# Patient Record
Sex: FEMALE | Race: WHITE | NOT HISPANIC OR LATINO | URBAN - METROPOLITAN AREA
[De-identification: names, ages, dates, MRNs, and addresses within clinical notes are randomized per-mention and may not be internally consistent; named-entity substitution may affect disease eponyms.]

---

## 2023-02-06 ENCOUNTER — APPOINTMENT (RX ONLY)
Dept: URBAN - METROPOLITAN AREA CLINIC 12 | Facility: CLINIC | Age: 28
Setting detail: DERMATOLOGY
End: 2023-02-06

## 2023-02-06 DIAGNOSIS — L90.5 SCAR CONDITIONS AND FIBROSIS OF SKIN: ICD-10-CM

## 2023-02-06 PROCEDURE — ? PATIENT SPECIFIC COUNSELING

## 2023-02-06 PROCEDURE — ? INTRALESIONAL KENALOG (COSMETIC)

## 2023-02-06 PROCEDURE — ? COUNSELING - SCAR (COSMETIC)

## 2023-02-06 ASSESSMENT — LOCATION SIMPLE DESCRIPTION DERM
LOCATION SIMPLE: RIGHT LIP
LOCATION SIMPLE: RIGHT LIP

## 2023-02-06 ASSESSMENT — LOCATION DETAILED DESCRIPTION DERM
LOCATION DETAILED: RIGHT INFERIOR VERMILION LIP
LOCATION DETAILED: RIGHT INFERIOR VERMILION LIP

## 2023-02-06 ASSESSMENT — LOCATION ZONE DERM
LOCATION ZONE: LIP
LOCATION ZONE: LIP

## 2023-02-06 NOTE — PROCEDURE: COUNSELING - SCAR (COSMETIC)
Detail Level: Detailed
Procedure Quote Preamble: The recommended treatment regimen includes all modalities selected and is global for the treatments during a 6 month period. Treatment is not billable to insurance. Treatment cost: $
Count Minor/Major Decisions Toward Mdm (Not Cosmetic)?: No

## 2023-02-06 NOTE — PROCEDURE: INTRALESIONAL KENALOG (COSMETIC)
Kenalog Preparation: kenalog
Detail Level: Detailed
Concentration (Mg/Ml): 1
Administered By (Optional): Dr. De Leon
Consent: The risks of atrophy were reviewed with the patient.

## 2023-02-06 NOTE — PROCEDURE: PATIENT SPECIFIC COUNSELING
Other (Free Text): Patient voiced she has some concerns regarding her lip feeling like there’s a bump where the scar is at. Would like to see what options can help reduce the bumpy feeling. She states she has no pain but more of discomfort. The patient has no other concerns or complaints. \\n\\Dany De Leon examine the patient and voiced surgery was not needed. He did recommend Kenalog 10 injection and patient voiced she would like to try that. Dr. De Leon also voiced patient can massage the area with an electric toothbrush. Kenalog 10 injection was given to the patient and she tolerated very well. Return back 4 weeks.
Detail Level: Zone

## 2023-03-06 ENCOUNTER — APPOINTMENT (RX ONLY)
Dept: URBAN - METROPOLITAN AREA CLINIC 12 | Facility: CLINIC | Age: 28
Setting detail: DERMATOLOGY
End: 2023-03-06

## 2023-03-06 DIAGNOSIS — L90.5 SCAR CONDITIONS AND FIBROSIS OF SKIN: ICD-10-CM

## 2023-03-06 PROCEDURE — ? INTRALESIONAL KENALOG

## 2023-03-06 PROCEDURE — ? PATIENT SPECIFIC COUNSELING

## 2023-03-06 PROCEDURE — 11900 INJECT SKIN LESIONS </W 7: CPT

## 2023-03-06 PROCEDURE — ? COUNSELING - SCAR (COSMETIC)

## 2023-03-06 ASSESSMENT — LOCATION ZONE DERM
LOCATION ZONE: LIP
LOCATION ZONE: LIP

## 2023-03-06 ASSESSMENT — LOCATION SIMPLE DESCRIPTION DERM
LOCATION SIMPLE: LEFT LIP
LOCATION SIMPLE: RIGHT LIP

## 2023-03-06 ASSESSMENT — LOCATION DETAILED DESCRIPTION DERM
LOCATION DETAILED: LEFT INFERIOR VERMILION LIP
LOCATION DETAILED: RIGHT INFERIOR VERMILION LIP

## 2023-03-06 NOTE — PROCEDURE: PATIENT SPECIFIC COUNSELING
Other (Free Text): Patient voiced she has some concerns regarding her lip feeling like there?s a bump where the scar is at. Would like to see what options can help reduce the bumpy feeling. She states she has no pain but more of discomfort. The patient has no other concerns or complaints. \\n\\Dany De Leon examine the patient and voiced surgery was not needed. He did recommend Kenalog 10 injection and patient voiced she would like to try that. Dr. De Leon also voiced patient can massage the area with an electric toothbrush. Kenalog 10 injection was given to the patient and she tolerated very well. Return back 4 weeks.\\n\\n\\nPatient RTC from previous visit from her Kenalog 10 injection. Dr. De Leon voiced that the patient scar is looking great
Detail Level: Zone

## 2023-03-06 NOTE — PROCEDURE: INTRALESIONAL KENALOG
Kenalog Preparation: kenalog with 5-fluorouracil
Total Volume (Ccs): 0.2
Concentration (Mg/Ml): 10
Administered By (Optional): Dr. Leonard De Leon
Detail Level: Simple
Consent: The risks of atrophy were reviewed with the patient.